# Patient Record
(demographics unavailable — no encounter records)

---

## 2024-10-28 NOTE — ASSESSMENT
[FreeTextEntry1] : 13 yo M here for eating disorder evaluation.  Based on history and physical exam today patient meets DSM 5  criteria for Other Specified Feeding or Eating Disorder (OSFED) more specifically Atypical Anorexia Nervosa as He displays 1) Persistent restriction of energy intake 2) an intense fear of gaining weight or of becoming fat and 3) undue influence of body shape and weight on self-evaluation however despite significant weight loss, weight is within or above the normal range for height. Discussed risks of dangerous weight loss behaviors which include and are not limited to: GI effects (CAYDEN, GERD, esophageal dysmotility, abdominal pain and bloating, constipation, ileus, IBS, etc.), Renal and Electrolyte abnormalities (dehydration, hypokalemia, hypochloremia, hyponatremia, metabolic alkalosis etc.), Cardiovascular (hypotension, sinus tachycardia, palpitations, edema, life threatening arrhythmias etc.), Endocrine effects on bone health and reproductive abnormalities, Hormonal effects etc.   #protein calorie malnutrition/weight loss/nutritional deficiency -1st step is stopping continued weight loss, we discussed restrict/binge cycles -reviewed nutritional deficiencies -nutritional counseling provided -RD reviewed recalls and made specific recommendations to normalize eating patterns.  -we discussed prognosis and when patients qualify for higher level of care options.  -advised labs to rule out alternate diagnosis; Mom reported just had labs done at PMD, will bring to next visit before they do additional blood work.   #anxiety -c/w psychotherapy at this time -will consider psychiatry evaluation and med mgt at future visits   RTC q2-3 weeks  
[FreeTextEntry1] : 15 yo M here for eating disorder evaluation.  Based on history and physical exam today patient meets DSM 5  criteria for Other Specified Feeding or Eating Disorder (OSFED) more specifically Atypical Anorexia Nervosa as He displays 1) Persistent restriction of energy intake 2) an intense fear of gaining weight or of becoming fat and 3) undue influence of body shape and weight on self-evaluation however despite significant weight loss, weight is within or above the normal range for height. Discussed risks of dangerous weight loss behaviors which include and are not limited to: GI effects (CAYDEN, GERD, esophageal dysmotility, abdominal pain and bloating, constipation, ileus, IBS, etc.), Renal and Electrolyte abnormalities (dehydration, hypokalemia, hypochloremia, hyponatremia, metabolic alkalosis etc.), Cardiovascular (hypotension, sinus tachycardia, palpitations, edema, life threatening arrhythmias etc.), Endocrine effects on bone health and reproductive abnormalities, Hormonal effects etc.   #protein calorie malnutrition/weight loss/nutritional deficiency -1st step is stopping continued weight loss, we discussed restrict/binge cycles -reviewed nutritional deficiencies -nutritional counseling provided -RD reviewed recalls and made specific recommendations to normalize eating patterns.  -we discussed prognosis and when patients qualify for higher level of care options.  -advised labs to rule out alternate diagnosis; Mom reported just had labs done at PMD, will bring to next visit before they do additional blood work.   #anxiety -c/w psychotherapy at this time -will consider psychiatry evaluation and med mgt at future visits   RTC q2-3 weeks  
no

## 2024-10-28 NOTE — HISTORY OF PRESENT ILLNESS
[Purging ___] : no purging [Laxatives] : no laxatives [de-identified] : 15 yo M here for initial evaluation of eating disorder.  Patient referred by PMD and Infectious disease doc.   Per Mom, started to notice change in willingness to do normal activities. First snacking decreased then meals decreased. Never been a breakfast eating but by Sept 2024 Mom weighed him and noted he was down 20 pounds. So reached out to therapist, she advised PMD (Dr. Stevens) visit, saw doc for weight loss then and did "bloodwork" and ASO was elevated, ref'd to infectious doctor, ID doc evaluation was 2 weeks ago. They did more blood work, has follow up with ID for that. No definitive alternate diagnosis for weight loss.   Per Meliton - In IS24 in [facility] for 6-8th. In middle school, started putting in more effort into hair and looks.  Started looking at own body and felt didn't look like the other kids who looked better than him. 1st time ever thought about this was in 6th grade. Some kids in school, would say things like "you look really bad". Comments increased slightly through the years. Sometimes it would be people he would think are his friends, hurtful. Just dealt with it on own without involving parents or school officials.  Just bullied about looks, body, hair. Continued to keep feelings inside.  8th grade started changing what he was eating, started eating less snacks and eating more free fruits and veggies.    Also started [employer]. Would have them wear skin tight shorts and shirts and did not look how he looked. This was during 8th grade and 9th grade.  Then significant trigger was when went to event at swim club this summer (2024), saw everyone else's body and reports wanted to be like them. [employer] is usually an event with a lot of people and was feeling very self-conscious the entire time.  Started cutting out candy and junk food and reports that was "good" and now it's to a point "can't stop" and reports "cant go back instantly" to what was eating before.  Reports thinks he is doing the right thing for himself by restricting. Reports not having a goal weight but wants to be skinny and wants to change his stomach and his legs. Reports despite >40pounds weight loss reports not feeling good about his body and reports does not feel good in his body if he gains or loses.   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 Wears uniforms this year so less pressure with clothes.  Bullying about chang slurs. Tries best not to pay attention to them. Was all verbal, this year it became physical. School is involved and intervening. Mom on private interview reports that she is in touch with the school regarding the bullying.   2 brothers. Patient has identical twin and a 12 yo brother. Reports they dont care at all and he cares "a lot". Reports not understanding how they dont think about how they look.   Reports sometimes when he does eat, starts to "binge". No planned binges. Reports once starts cant stop. Feels guilty afterwards. Just sits with the guilt. Tries to eat 1 meal a day. Eats dinner. Calorie counting with goal of 2000-3000kcals/day on eating days. Has full days of no eating. On no eating days drinks Gatorade or water only. Just drinks water.  Weighing self BID.  More recently Mom reports him saying "I ate" more often.  Then will admit to Mom that he lied. This summer got a bike and he is biking more often.   ROS: Patient reports no hx of syncope, dizziness, headaches, no blurred or decreased vision, no nocturnal enuresis, no abdominal pain, no joint pain. No shortness of breath, no chest pain. No extremity swelling.  Seeing a therapist, diagnosed with anxiety. No high risk behaviors reported. See SW note for more details.  [de-identified] : 230 [de-identified] : July 2024 [de-identified] : 184 [de-identified] : 10/2024 [de-identified] : 184 [de-identified] : Biking. No other exercise. Does [employer] at school. In boy scouts.

## 2024-10-28 NOTE — HISTORY OF PRESENT ILLNESS
[Purging ___] : no purging [Laxatives] : no laxatives [de-identified] : 15 yo M here for initial evaluation of eating disorder.  Patient referred by PMD and Infectious disease doc.   Per Mom, started to notice change in willingness to do normal activities. First snacking decreased then meals decreased. Never been a breakfast eating but by Sept 2024 Mom weighed him and noted he was down 20 pounds. So reached out to therapist, she advised PMD (Dr. Stevens) visit, saw doc for weight loss then and did "bloodwork" and ASO was elevated, ref'd to infectious doctor, ID doc evaluation was 2 weeks ago. They did more blood work, has follow up with ID for that. No definitive alternate diagnosis for weight loss.   Per Meliton - In IS24 in [employer] for 6-8th. In middle school, started putting in more effort into hair and looks.  Started looking at own body and felt didn't look like the other kids who looked better than him. 1st time ever thought about this was in 6th grade. Some kids in school, would say things like "you look really bad". Comments increased slightly through the years. Sometimes it would be people he would think are his friends, hurtful. Just dealt with it on own without involving parents or school officials.  Just bullied about looks, body, hair. Continued to keep feelings inside.  8th grade started changing what he was eating, started eating less snacks and eating more free fruits and veggies.    Also started [employer]. Would have them wear skin tight shorts and shirts and did not look how he looked. This was during 8th grade and 9th grade.  Then significant trigger was when went to event at swim club this summer (2024), saw everyone else's body and reports wanted to be like them. [employer] is usually an event with a lot of people and was feeling very self-conscious the entire time.  Started cutting out candy and junk food and reports that was "good" and now it's to a point "can't stop" and reports "cant go back instantly" to what was eating before.  Reports thinks he is doing the right thing for himself by restricting. Reports not having a goal weight but wants to be skinny and wants to change his stomach and his legs. Reports despite >40pounds weight loss reports not feeling good about his body and reports does not feel good in his body if he gains or loses.   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 Wears uniforms this year so less pressure with clothes.  Bullying about chang slurs. Tries best not to pay attention to them. Was all verbal, this year it became physical. School is involved and intervening. Mom on private interview reports that she is in touch with the school regarding the bullying.   2 brothers. Patient has identical twin and a 14 yo brother. Reports they dont care at all and he cares "a lot". Reports not understanding how they dont think about how they look.   Reports sometimes when he does eat, starts to "binge". No planned binges. Reports once starts cant stop. Feels guilty afterwards. Just sits with the guilt. Tries to eat 1 meal a day. Eats dinner. Calorie counting with goal of 2000-3000kcals/day on eating days. Has full days of no eating. On no eating days drinks Gatorade or water only. Just drinks water.  Weighing self BID.  More recently Mom reports him saying "I ate" more often.  Then will admit to Mom that he lied. This summer got a bike and he is biking more often.   ROS: Patient reports no hx of syncope, dizziness, headaches, no blurred or decreased vision, no nocturnal enuresis, no abdominal pain, no joint pain. No shortness of breath, no chest pain. No extremity swelling.  Seeing a therapist, diagnosed with anxiety. No high risk behaviors reported. See SW note for more details.  [de-identified] : 230 [de-identified] : July 2024 [de-identified] : 184 [de-identified] : 10/2024 [de-identified] : 184 [de-identified] : Biking. No other exercise. Does [employer] at school. In boy scouts.

## 2024-12-13 NOTE — DATA REVIEWED
[FreeTextEntry1] : Labs done  9/17/24 TSH 2.05, Ft4 1.4 Labs done 10/12/24 TG Ab 128- Elevated  TSH 1.04, FT4 1.3 He has not had repeat since that time.

## 2024-12-13 NOTE — DISCUSSION/SUMMARY
[FreeTextEntry1] : Meliton is a 14y5mM with anxiety, noted to have 40lb weight loss between July and October 2024 (Now seeing [facility] Disorder Clinic at [facility]), who is presenting for initial endocrine visit for positive TG Ab.   In discussion with mother, over the summer of 2024, developed severe anxiety and ultimately 40lb weight loss- now being followed closely by [facility] in [place] with some improvement. On labs done by Dr. Oshea, noted to have normal TFTS in September and October, but elevated TG Ab.   I discussed with family today that given positive TG Ab, although his thyroid function is currently normal, he is at risk of thyroid dysfunction in the future. His thyroid function should be monitored about every 6 months.  normal...

## 2024-12-13 NOTE — PHYSICAL EXAM
[Dysmorphic] : non-dysmorphic [Microcephaly] : no microcephaly [Normal Appearance] : normal appearance [Well formed] : well formed [Normally Set] : normally set [WNL for age] : within normal limits of age [Goiter] : no goiter [Enlarged Diffusely] : was not enlarged [Normal S1 and S2] : normal S1 and S2 [Murmur] : no murmurs [Clear to Ausculation Bilaterally] : clear to auscultation bilaterally [Mild Diffuse Bilateral Wheezing] : no mild diffuse wheezing [Abdomen Soft] : soft [Abdomen Tenderness] : non-tender [Normal] : normal  [de-identified] : Very anxious appearing on exam [de-identified] : Glasses in place [de-identified] : Patient extremely anxious on exam- unable to do  exam

## 2024-12-13 NOTE — PAST MEDICAL HISTORY
[At ___ Weeks Gestation] : at [unfilled] weeks gestation [Multiple Gestation] : multiple gestation [Speech Delay w/ Normal Development] : patient has speech delay with normal development [FreeTextEntry1] : 5lb9oz [de-identified] : Twin [FreeTextEntry3] : Speech dealy- spoke at 3 years old, speech services since last year.

## 2024-12-13 NOTE — CONSULT LETTER
[Dear  ___] : Dear  [unfilled], [Consult Letter:] : I had the pleasure of evaluating your patient, [unfilled]. [Please see my note below.] : Please see my note below. [Consult Closing:] : Thank you very much for allowing me to participate in the care of this patient.  If you have any questions, please do not hesitate to contact me. [Sincerely,] : Sincerely, [FreeTextEntry3] : Regla Loco MD Pediatric Endocrinology [facility] [phone]

## 2024-12-13 NOTE — HISTORY OF PRESENT ILLNESS
[FreeTextEntry2] : Meliton is a 14y5mM with anxiety, noted to have 40lb weight loss between July and October 2024 (Now seeing Eating Disorder Clinic at [facility]), who is presenting for initial endocrine visit for positive TG Ab.   Per mother, was in normal state of health until summer 2024.  At that time, mother noticed significant anxiety developed and as a result, he had a significant decrease in his appetite. He has been eating very minimal amounts.  He was seen by PCP who recommended ID evaluation due to positive Anti-streptolysin O.  He was seen by Dr. Oshea who performed additional bloodwork including thyroid function and antibodies.   Labs done  9/17/24 TSH 2.05, Ft4 1.4 Labs done 10/12/24 TG Ab 128- Elevated  TSH 1.04, FT4 1.3 He has not had repeat since that time.   Denies: Excessive fatigue/tiredness. Denies skin/hair changes. Denies constipation/diarrhea.   Per mother, has been going much better with eating since seeing Eating Disorder Clinic.  Had been going about once per month.  Mother feels he has been doing better- eating larger portions, much improved compared to prior.  Next appt is 1/15/24 with Eating Disorder Clinic.   Family History:  Maternal Great Aunt: Hyperthyroididsm Mother: Thyroid Nodule- she reports was found incidentally on MRI when she was having an MRI for her neck/shoulder. She reports nodule was biopsied and noted to be benign- no follow up needed.  Mother: Psoriatic Arthritis.

## 2024-12-13 NOTE — ASSESSMENT
[FreeTextEntry1] : Meliton is a 14y5mM with anxiety, noted to have 40lb weight loss between July and October 2024 (Now seeing Eating Disorder Clinic at [facility]), who is presenting for initial endocrine visit for positive TG Ab.   Plan:  - Labs to be repeated in March 2024.  TSH, Ft4, T4, T3 TG and TPO Ab TSI  - Follow up in March after labs are done to discuss results.  - If function continues to be normal will follow up again in 6 months.   Regla Loco MD Pediatric Endocrinology [facility] [phone]

## 2025-04-03 NOTE — ASSESSMENT
[FreeTextEntry1] : Meliton is a 14y8mM with anxiety, noted to have had 40lb weight loss between July and October 2024 (seen by [facility] Disorder Clinic at [facility] and much improvement now back to his baseline), who is presenting for follow up endocrine visit for positive TG Ab.    Plan:  - Labs to be repeated in 6 months.  TSH, Ft4,  TG and TPO Ab  - Follow up 6 months.   - Counseled mother and patient on signs/symptoms of Hypothyroidism today and advised if these occur prior to 6 months to call and do bloodwork sooner.   Regla Loco MD Pediatric Endocrinology [facility] [phone]

## 2025-04-03 NOTE — PAST MEDICAL HISTORY
[At ___ Weeks Gestation] : at [unfilled] weeks gestation [Multiple Gestation] : multiple gestation [Speech Delay w/ Normal Development] : patient has speech delay with normal development [FreeTextEntry1] : 5lb9oz [de-identified] : Twin [FreeTextEntry3] : Speech dealy- spoke at 3 years old, speech services since last year.

## 2025-04-03 NOTE — ASSESSMENT
[FreeTextEntry1] : Meliton is a 14y8mM with anxiety, noted to have had 40lb weight loss between July and October 2024 (seen by Eating Disorder Clinic at [facility] and much improvement now back to his baseline), who is presenting for follow up endocrine visit for positive TG Ab.    Plan:  - Labs to be repeated in 6 months.  TSH, Ft4,  TG and TPO Ab  - Follow up 6 months.   - Counseled mother and patient on signs/symptoms of Hypothyroidism today and advised if these occur prior to 6 months to call and do bloodwork sooner.   Regla Loco MD Pediatric Endocrinology [facility] [phone]

## 2025-04-03 NOTE — DATA REVIEWED
[FreeTextEntry1] : Labs done  9/17/24 TSH 2.05, Ft4 1.4 Labs done 10/12/24 TG Ab 128- Elevated  TSH 1.04, FT4 1.3 He has not had repeat since that time.   Most recent labs done 3/29/25 TSH 1.20 (0.50-4.30) Ft4 T4 1.2 (0.9-1.7) T4 8.1 (4.6-12.0) Total T3 156 () TG Ab 180 (</=115)- Elevated TPO Ab 12.4 (</= 34).

## 2025-04-03 NOTE — HISTORY OF PRESENT ILLNESS
[FreeTextEntry2] : Meliton is a 14y8mM with anxiety, noted to have had 40lb weight loss between July and October 2024 (seen by [facility] Disorder Clinic at [facility] and much improvement now back to his baseline), who is presenting for follow up endocrine visit for positive TG Ab.   He was in normal state of health until summer 2024.  At that time, mother noticed significant anxiety developed and as a result, he had a significant decrease in his appetite. He was seen by PCP who recommended ID evaluation due to positive Anti-streptolysin O.  He was seen by Dr. Oshea who performed additional bloodwork including thyroid function and antibodies.   Labs done  9/17/24 TSH 2.05, Ft4 1.4  Labs done 10/12/24 TG Ab 128- Elevated  TSH 1.04, FT4 1.3  I saw him for his first visit on 12/13/25.   Interval Events:  - No significant changes since last visit. He has been feeling well. No issues.  - He has been eating so much better- he is no longer seeing eating disorder clinic due to great progress.  - He is noted to have gained 22lbs since last visit, which is  to his baseline.  - Denies: Excessive fatigue/tiredness. Denies skin/hair changes. Denies constipation/diarrhea.   - Most recent labs done 3/29/25 TSH 1.20 (0.50-4.30) Ft4 T4 1.2 (0.9-1.7) T4 8.1 (4.6-12.0) Total T3 156 () TG Ab 180 (</=115)- Elevated TPO Ab 12.4 (</= 34).

## 2025-04-03 NOTE — PHYSICAL EXAM
[Normal Appearance] : normal appearance [Well formed] : well formed [Normally Set] : normally set [WNL for age] : within normal limits of age [Normal S1 and S2] : normal S1 and S2 [Clear to Ausculation Bilaterally] : clear to auscultation bilaterally [Abdomen Soft] : soft [Abdomen Tenderness] : non-tender [Normal] : normal  [Dysmorphic] : non-dysmorphic [Acanthosis Nigricans___] : no acanthosis nigricans [Microcephaly] : no microcephaly [Goiter] : no goiter [Enlarged Diffusely] : was not enlarged [None] : there were no thyroid nodules [Murmur] : no murmurs [Mild Diffuse Bilateral Wheezing] : no mild diffuse wheezing [de-identified] : Anxious on exam [de-identified] : Glasses in place [de-identified] : Patient extremely anxious on exam- unable to do  exam

## 2025-04-03 NOTE — DATA REVIEWED
[FreeTextEntry1] : Labs done  9/17/24 TSH 2.05, Ft4 1.4 Labs done 10/12/24 TG Ab 128- Elevated  TSH 1.04, FT4 1.3 He has not had repeat since that time.   Most recent labs done 3/29/25 TSH 1.20 (0.50-4.30) Ft4 T4 1.2 (0.9-1.7) T4 8.1 (4.6-12.0) Total T3 156 () TG Ab 180 (</=115)- Elevated TPO Ab 12.4 (</= 34). (4) Neurological Diagnosis

## 2025-04-03 NOTE — HISTORY OF PRESENT ILLNESS
[FreeTextEntry2] : Meliton is a 14y8mM with anxiety, noted to have had 40lb weight loss between July and October 2024 (seen by [facility] Disorder Clinic at [facility] and much improvement now back to his baseline), who is presenting for follow up endocrine visit for positive TG Ab.   He was in normal state of health until summer 2024.  At that time, mother noticed significant anxiety developed and as a result, he had a significant decrease in his appetite. He was seen by PCP who recommended ID evaluation due to positive Anti-streptolysin O.  He was seen by Dr. Oshea who performed additional bloodwork including thyroid function and antibodies.   Labs done  9/17/24 TSH 2.05, Ft4 1.4  Labs done 10/12/24 TG Ab 128- Elevated  TSH 1.04, FT4 1.3  I saw him for his first visit on 12/13/25.   Interval Events:  - No significant changes since last visit. He has been feeling well. No issues.  - He has been eating so much better- he is no longer seeing eating disorder clinic due to great progress.  - He is noted to have gained 22lbs since last visit, which is  to his baseline.  - Denies: Excessive fatigue/tiredness. Denies skin/hair changes. Denies constipation/diarrhea.   - Most recent labs done 3/29/25 TSH 1.20 (0.50-4.30) Ft4 T4 1.2 (0.9-1.7) T4 8.1 (4.6-12.0) Total T3 156 () TG Ab 180 (</=115)- Elevated TPO Ab 12.4 (</= 34).

## 2025-04-03 NOTE — CONSULT LETTER
[Dear  ___] : Dear  [unfilled], [Consult Letter:] : I had the pleasure of evaluating your patient, [unfilled]. [Please see my note below.] : Please see my note below. [Consult Closing:] : Thank you very much for allowing me to participate in the care of this patient.  If you have any questions, please do not hesitate to contact me. [Sincerely,] : Sincerely, [Courtesy Letter:] : I had the pleasure of seeing your patient, [unfilled], in my office today. [FreeTextEntry3] : Regla Loco MD Pediatric Endocrinology [facility] [phone]

## 2025-04-03 NOTE — DISCUSSION/SUMMARY
[FreeTextEntry1] : Meliton is a 14y8mM with anxiety, noted to have had 40lb weight loss between July and October 2024 (seen by [facility] Disorder Clinic at [facility] and much improvement now back to his baseline), who is presenting for follow up endocrine visit for positive TG Ab.    In 2024, Meliton developed severe anxiety and ultimately 40lb weight loss- He was being followed closely by [facility] in [place] with significant improvement- back to baseline and no longer following with them. On labs done by Dr. Oshea, noted to have normal TFTS in September and October, but elevated TG Ab. Labs repeated prior to today's visit noted to have continued normal TFTS, but positive TG Ab.   I discussed again with family today that given positive TG Ab, although his thyroid function is currently normal, he is at risk of thyroid dysfunction in the future. His thyroid function should be monitored about every 6 months and advised family to call between visits if signs/symptoms of hypothyroidism occur.

## 2025-04-03 NOTE — PHYSICAL EXAM
[Normal Appearance] : normal appearance [Well formed] : well formed [Normally Set] : normally set [WNL for age] : within normal limits of age [Normal S1 and S2] : normal S1 and S2 [Clear to Ausculation Bilaterally] : clear to auscultation bilaterally [Abdomen Soft] : soft [Abdomen Tenderness] : non-tender [Normal] : normal  [Dysmorphic] : non-dysmorphic [Acanthosis Nigricans___] : no acanthosis nigricans [Microcephaly] : no microcephaly [Goiter] : no goiter [Enlarged Diffusely] : was not enlarged [None] : there were no thyroid nodules [Murmur] : no murmurs [Mild Diffuse Bilateral Wheezing] : no mild diffuse wheezing [de-identified] : Anxious on exam [de-identified] : Glasses in place [de-identified] : Patient extremely anxious on exam- unable to do  exam

## 2025-04-03 NOTE — REVIEW OF SYSTEMS
[Nl] : Neurological [Diarrhea] : no diarrhea [Decrease In Appetite] : no decrease in appetite [Constipation] : no constipation [Headache] : no headache [Dizziness] : no dizziness [Cold Intolerance] : cold tolerant [Heat Intolerance] : heat tolerant [Loss Of Hair] : no loss of hair [Hair Symptoms] : no hair symptoms [Nail Symptoms] : no nail symptoms [Polydipsia] : no polydipsia [Polyuria] : no polyuria

## 2025-04-03 NOTE — CONSULT LETTER
[Dear  ___] : Dear  [unfilled], [Consult Letter:] : I had the pleasure of evaluating your patient, [unfilled]. [Please see my note below.] : Please see my note below. [Consult Closing:] : Thank you very much for allowing me to participate in the care of this patient.  If you have any questions, please do not hesitate to contact me. [Sincerely,] : Sincerely, [Courtesy Letter:] : I had the pleasure of seeing your patient, [unfilled], in my office today. [FreeTextEntry3] : Regla Loco MD Pediatric Endocrinology [facility], [facility] [phone]

## 2025-04-03 NOTE — DISCUSSION/SUMMARY
[FreeTextEntry1] : Meliton is a 14y8mM with anxiety, noted to have had 40lb weight loss between July and October 2024 (seen by [facility] and much improvement now back to his baseline), who is presenting for follow up endocrine visit for positive TG Ab.    In 2024, Meliton developed severe anxiety and ultimately 40lb weight loss- He was being followed closely by [facility] in [place] with significant improvement- back to baseline and no longer following with them. On labs done by Dr. Oshea, noted to have normal TFTS in September and October, but elevated TG Ab. Labs repeated prior to today's visit noted to have continued normal TFTS, but positive TG Ab.   I discussed again with family today that given positive TG Ab, although his thyroid function is currently normal, he is at risk of thyroid dysfunction in the future. His thyroid function should be monitored about every 6 months and advised family to call between visits if signs/symptoms of hypothyroidism occur.